# Patient Record
Sex: MALE | Race: WHITE | NOT HISPANIC OR LATINO | Employment: FULL TIME | URBAN - METROPOLITAN AREA
[De-identification: names, ages, dates, MRNs, and addresses within clinical notes are randomized per-mention and may not be internally consistent; named-entity substitution may affect disease eponyms.]

---
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
## 2023-01-19 ENCOUNTER — OFFICE VISIT (OUTPATIENT)
Dept: FAMILY MEDICINE CLINIC | Facility: CLINIC | Age: 18
End: 2023-01-19
Payer: COMMERCIAL

## 2023-01-19 VITALS
TEMPERATURE: 98.2 F | WEIGHT: 217.6 LBS | OXYGEN SATURATION: 98 % | SYSTOLIC BLOOD PRESSURE: 116 MMHG | DIASTOLIC BLOOD PRESSURE: 80 MMHG | BODY MASS INDEX: 25.8 KG/M2 | HEART RATE: 77 BPM

## 2023-01-19 DIAGNOSIS — Z98.890 HISTORY OF HEART SURGERY: ICD-10-CM

## 2023-01-19 DIAGNOSIS — N46.9 INFERTILITY MALE: ICD-10-CM

## 2023-01-19 DIAGNOSIS — R06.83 SNORING: ICD-10-CM

## 2023-01-19 DIAGNOSIS — F41.9 ANXIETY: ICD-10-CM

## 2023-01-19 DIAGNOSIS — M95.2 SKULL DEFECT: ICD-10-CM

## 2023-01-19 DIAGNOSIS — F32.A DEPRESSION, UNSPECIFIED DEPRESSION TYPE: Primary | ICD-10-CM

## 2023-01-19 DIAGNOSIS — Z13.220 SCREENING CHOLESTEROL LEVEL: ICD-10-CM

## 2023-01-19 PROCEDURE — 99204 OFFICE O/P NEW MOD 45 MIN: CPT | Performed by: STUDENT IN AN ORGANIZED HEALTH CARE EDUCATION/TRAINING PROGRAM

## 2023-01-19 RX ORDER — HYDROXYZINE HYDROCHLORIDE 25 MG/1
25 TABLET, FILM COATED ORAL EVERY 8 HOURS PRN
Qty: 30 TABLET | Refills: 0 | Status: SHIPPED | OUTPATIENT
Start: 2023-01-19

## 2023-01-19 NOTE — PATIENT INSTRUCTIONS
Thank you for allowing us to participate in your care.  Please call with any questions or concerns.;

## 2023-01-19 NOTE — PROGRESS NOTES
"Chief Complaint  Establish Care    Subjective        Keo Ji presents to [facility] PRIMARY [facility]  History of Present Illness     Wants to establish care. Pediatrician was Dr. Guerrero.   Had heart surgery for PFO at age 10. Was seen for 5 year follow up and was doing ok.  Does have terrible anxiety and depression. Does not have a psychiatrist. Tried a daily medication when he was little but had suicidal thoughts. Believe it was prozac. Is constantly worrying that something is wrong with his health. Will feel a bump on his arm and believe he is going to die.   Has never seen a therapist or counselor.   Currently smokes weed and drinks alcohol to help cope with anxiety and depression. Sometimes alcohol does cause him to be more depressed and other times he just blacks out so he doesn't feel anything. Has thought about alcohol treatment and has tried to quit on his own. Does have occasional thoughts of self harm. Does not currently have those thoughts.   PHQ score of 24 with things being very difficult.   Accompanied by mom. Believes he has sleep apnea. Snores. Father has it.   Had an episode where he felt his vision closing in, was shaking some, and had some foaming at the mouth. Does remember the episode. Did not have incontinence or bite his tongue.   Has fertility concerns as he has had unprotected sex for years and has not gotten anyone pregnant.     Objective   Vital Signs:  /80 (BP Location: Left arm, Patient Position: Sitting, Cuff Size: Adult)   Pulse 77   Temp 98.2 °F (36.8 °C) (Temporal)   Wt 98.7 kg (217 lb 9.6 oz)   SpO2 98%   BMI 25.80 kg/m²   Estimated body mass index is 25.8 kg/m² as calculated from the following:    Height as of 5/9/22: 195.6 cm (77\").    Weight as of this encounter: 98.7 kg (217 lb 9.6 oz).  78 %ile (Z= 0.76) based on CDC (Boys, 2-20 Years) BMI-for-age data using weight from 1/19/2023 and height from 5/9/2022.          Physical Exam  Vitals and nursing " note reviewed.   Constitutional:       General: He is not in acute distress.     Appearance: Normal appearance. He is not ill-appearing.   HENT:      Head: Normocephalic and atraumatic.      Comments: Bony prominence posterior skull     Nose: Nose normal.      Mouth/Throat:      Mouth: Mucous membranes are moist.   Eyes:      Extraocular Movements: Extraocular movements intact.      Conjunctiva/sclera: Conjunctivae normal.   Cardiovascular:      Rate and Rhythm: Normal rate and regular rhythm.      Heart sounds: Normal heart sounds. No murmur heard.    No gallop.   Pulmonary:      Effort: Pulmonary effort is normal. No respiratory distress.      Breath sounds: Normal breath sounds. No stridor. No wheezing, rhonchi or rales.   Chest:      Chest wall: No mass, lacerations, deformity or tenderness.   Skin:     General: Skin is warm and dry.   Neurological:      General: No focal deficit present.      Mental Status: He is alert and oriented to person, place, and time. Mental status is at baseline.   Psychiatric:         Mood and Affect: Mood normal.         Behavior: Behavior normal.        Result Review :                   Assessment and Plan   Diagnoses and all orders for this visit:    1. Depression, unspecified depression type (Primary)  -     Ambulatory Referral to Psychiatry  -     Ambulatory Referral to Behavioral Health  -     Comprehensive metabolic panel  -     TSH Rfx On Abnormal To Free T4    2. Anxiety  -     Ambulatory Referral to Psychiatry  -     Ambulatory Referral to Behavioral Health  -     hydrOXYzine (ATARAX) 25 MG tablet; Take 1 tablet by mouth Every 8 (Eight) Hours As Needed for Anxiety.  Dispense: 30 tablet; Refill: 0  -     Comprehensive metabolic panel  -     TSH Rfx On Abnormal To Free T4    3. History of heart surgery    4. Skull defect  -     Cancel: XR Skull Complete 4+ View (In Office)  -     XR Skull Complete 4+ View (In Office); Future    5. Snoring  -     Ambulatory Referral to Sleep  Medicine    6. Infertility male  -     Cancel: Semen Analysis - Semen, Voided  -     Comprehensive metabolic panel  -     TSH Rfx On Abnormal To Free T4    7. Screening cholesterol level  -     Lipid panel        Declines additional medication for anxiety or depression today as he has fears regarding increased suicidal thoughts. Does agree to see psychiatry and . Will place referrals today. Does drink and has some interest in stopping.   Will refer for sleep apnea testing.          Follow Up   Return in about 4 weeks (around 2/16/2023) for Annual physical.  Patient was given instructions and counseling regarding his condition or for health maintenance advice. Please see specific information pulled into the AVS if appropriate.

## 2023-01-20 LAB
ALBUMIN SERPL-MCNC: 4.8 G/DL (ref 3.5–5.2)
ALBUMIN/GLOB SERPL: 2.2 G/DL
ALP SERPL-CCNC: 82 U/L (ref 39–117)
ALT SERPL-CCNC: 28 U/L (ref 1–41)
AST SERPL-CCNC: 24 U/L (ref 1–40)
BILIRUB SERPL-MCNC: 0.4 MG/DL (ref 0–1.2)
BUN SERPL-MCNC: 15 MG/DL (ref 6–20)
BUN/CREAT SERPL: 16.1 (ref 7–25)
CALCIUM SERPL-MCNC: 9.7 MG/DL (ref 8.6–10.5)
CHLORIDE SERPL-SCNC: 103 MMOL/L (ref 98–107)
CHOLEST SERPL-MCNC: 164 MG/DL (ref 0–200)
CO2 SERPL-SCNC: 28.9 MMOL/L (ref 22–29)
CREAT SERPL-MCNC: 0.93 MG/DL (ref 0.76–1.27)
EGFRCR SERPLBLD CKD-EPI 2021: 121.3 ML/MIN/1.73
GLOBULIN SER CALC-MCNC: 2.2 GM/DL
GLUCOSE SERPL-MCNC: 102 MG/DL (ref 65–99)
HDLC SERPL-MCNC: 50 MG/DL (ref 40–60)
LDLC SERPL CALC-MCNC: 96 MG/DL (ref 0–100)
POTASSIUM SERPL-SCNC: 4.1 MMOL/L (ref 3.5–5.2)
PROT SERPL-MCNC: 7 G/DL (ref 6–8.5)
SODIUM SERPL-SCNC: 141 MMOL/L (ref 136–145)
TRIGL SERPL-MCNC: 95 MG/DL (ref 0–150)
TSH SERPL DL<=0.005 MIU/L-ACNC: 0.8 UIU/ML (ref 0.27–4.2)
VLDLC SERPL CALC-MCNC: 18 MG/DL (ref 5–40)

## 2023-01-23 ENCOUNTER — TELEPHONE (OUTPATIENT)
Dept: FAMILY MEDICINE CLINIC | Facility: CLINIC | Age: 18
End: 2023-01-23
Payer: COMMERCIAL

## 2023-01-23 LAB
SPECIMEN VOL SMN: 0.6 ML
SPERM IMMOTILE # SMN: 50.1 X10E6/ML
SPERM MOTILE # SMN: ABNORMAL X10E6/ML

## 2023-01-23 NOTE — TELEPHONE ENCOUNTER
Called, unable to leave message because mailbox full, will try again      **HUB/ ** MAY RELAY MESSAGE      ----- Message from Sangeetha Curiel DO sent at 1/20/2023  1:07 PM EST -----  Please call pt w the following     Sugar is mildly elevated. Other labs look normal. Semen analysis is still pending.

## 2023-02-01 ENCOUNTER — TELEPHONE (OUTPATIENT)
Dept: FAMILY MEDICINE CLINIC | Facility: CLINIC | Age: 18
End: 2023-02-01
Payer: COMMERCIAL

## 2023-02-01 DIAGNOSIS — R86.8 DECREASED SPERM MOTILITY: Primary | ICD-10-CM

## 2023-02-01 NOTE — TELEPHONE ENCOUNTER
Called patient and mailbox was full if patient calls back please transfer him back to extension [phone]

## 2023-02-01 NOTE — TELEPHONE ENCOUNTER
Called patient was unable to leave voicemail at 12:02 if patient calls back okay for  and hub to read     ----- Message -----  From: Sangeetha Curiel DO  Sent: 2/1/2023   6:20 AM EST  To: [facility] [facility]    Please let patient know his semen analysis was abnormal. His sperm were not motile. This could be due to a true motility problem or could be due to the sample sitting for too long. We can refer him to a specialist to further evaluate this.

## 2023-04-12 ENCOUNTER — TELEPHONE (OUTPATIENT)
Dept: FAMILY MEDICINE CLINIC | Facility: CLINIC | Age: 18
End: 2023-04-12
Payer: COMMERCIAL

## 2023-10-19 ENCOUNTER — OFFICE VISIT (OUTPATIENT)
Dept: FAMILY MEDICINE CLINIC | Facility: CLINIC | Age: 18
End: 2023-10-19
Payer: COMMERCIAL

## 2023-10-19 VITALS
OXYGEN SATURATION: 97 % | TEMPERATURE: 98.9 F | WEIGHT: 246 LBS | HEART RATE: 93 BPM | SYSTOLIC BLOOD PRESSURE: 141 MMHG | DIASTOLIC BLOOD PRESSURE: 80 MMHG | BODY MASS INDEX: 29.17 KG/M2

## 2023-10-19 DIAGNOSIS — L72.9 SKIN CYST: ICD-10-CM

## 2023-10-19 DIAGNOSIS — Q55.9 SCROTAL ANOMALY: ICD-10-CM

## 2023-10-19 DIAGNOSIS — R86.8 DECREASED SPERM MOTILITY: Primary | ICD-10-CM

## 2023-10-19 PROCEDURE — 99213 OFFICE O/P EST LOW 20 MIN: CPT | Performed by: STUDENT IN AN ORGANIZED HEALTH CARE EDUCATION/TRAINING PROGRAM

## 2023-10-19 NOTE — PROGRESS NOTES
"Chief Complaint  Testicle Pain    Subjective        Keo Ji presents to [facility] PRIMARY [facility]  History of Present Illness    Here today with concerns for his testicles. No pain. Someone he knows had a testicular issue and he had never been checked.   Has a red bump on his chest and feels he may need to have a dermatologist.    Objective   Vital Signs:  /80 (BP Location: Left arm, Patient Position: Sitting, Cuff Size: Large Adult)   Pulse 93   Temp 98.9 °F (37.2 °C)   Wt 112 kg (246 lb)   SpO2 97%   BMI 29.17 kg/m²   Estimated body mass index is 29.17 kg/m² as calculated from the following:    Height as of 5/9/22: 195.6 cm (77\").    Weight as of this encounter: 112 kg (246 lb).  Facility age limit for growth %kee is 20 years.          Physical Exam  Vitals and nursing note reviewed.   Constitutional:       General: He is not in acute distress.     Appearance: Normal appearance. He is not ill-appearing.   HENT:      Head: Normocephalic and atraumatic.      Nose: Nose normal.      Mouth/Throat:      Mouth: Mucous membranes are moist.   Eyes:      Extraocular Movements: Extraocular movements intact.      Conjunctiva/sclera: Conjunctivae normal.   Cardiovascular:      Rate and Rhythm: Normal rate and regular rhythm.      Heart sounds: Normal heart sounds. No murmur heard.     No gallop.   Pulmonary:      Effort: Pulmonary effort is normal. No respiratory distress.      Breath sounds: Normal breath sounds. No stridor. No wheezing, rhonchi or rales.   Chest:      Chest wall: No tenderness.   Skin:     General: Skin is warm and dry.   Neurological:      General: No focal deficit present.      Mental Status: He is alert and oriented to person, place, and time. Mental status is at baseline.   Psychiatric:         Mood and Affect: Mood normal.         Behavior: Behavior normal.        Result Review :                   Assessment and Plan   Diagnoses and all orders for this visit:    1. " Decreased sperm motility (Primary)  -     Ambulatory Referral to Infertility  -     Ambulatory Referral to Urology    2. Skin cyst  -     Ambulatory Referral to Dermatology    3. Scrotal anomaly  -     US Scrotum & Testicles  -     Ambulatory Referral to Urology             Follow Up   No follow-ups on file.  Patient was given instructions and counseling regarding his condition or for health maintenance advice. Please see specific information pulled into the AVS if appropriate.

## 2024-06-08 DIAGNOSIS — F41.9 ANXIETY: ICD-10-CM

## 2024-06-10 RX ORDER — HYDROXYZINE HYDROCHLORIDE 25 MG/1
25 TABLET, FILM COATED ORAL EVERY 8 HOURS PRN
Qty: 30 TABLET | Refills: 0 | OUTPATIENT
Start: 2024-06-10

## 2024-06-10 NOTE — TELEPHONE ENCOUNTER
LOV             10/19/2023   NOV             No follow-ups on file.   Last RF        1/19/223  Protocol       Not met    OXANA Conteh/GAUTAM

## 2024-06-18 ENCOUNTER — OFFICE VISIT (OUTPATIENT)
Dept: FAMILY MEDICINE CLINIC | Facility: CLINIC | Age: 19
End: 2024-06-18
Payer: COMMERCIAL

## 2024-06-18 VITALS
DIASTOLIC BLOOD PRESSURE: 72 MMHG | HEIGHT: 75 IN | WEIGHT: 260 LBS | SYSTOLIC BLOOD PRESSURE: 122 MMHG | HEART RATE: 78 BPM | TEMPERATURE: 98.2 F | BODY MASS INDEX: 32.33 KG/M2 | OXYGEN SATURATION: 97 %

## 2024-06-18 DIAGNOSIS — J02.9 SORE THROAT: ICD-10-CM

## 2024-06-18 DIAGNOSIS — R05.1 ACUTE COUGH: ICD-10-CM

## 2024-06-18 DIAGNOSIS — J45.20 MILD INTERMITTENT ASTHMA, UNSPECIFIED WHETHER COMPLICATED: ICD-10-CM

## 2024-06-18 DIAGNOSIS — J06.9 UPPER RESPIRATORY TRACT INFECTION, UNSPECIFIED TYPE: Primary | ICD-10-CM

## 2024-06-18 LAB
EXPIRATION DATE: ABNORMAL
EXPIRATION DATE: NORMAL
FLUAV AG UPPER RESP QL IA.RAPID: NOT DETECTED
FLUBV AG UPPER RESP QL IA.RAPID: NOT DETECTED
INTERNAL CONTROL: ABNORMAL
INTERNAL CONTROL: NORMAL
Lab: ABNORMAL
Lab: NORMAL
S PYO AG THROAT QL: NEGATIVE
SARS-COV-2 AG UPPER RESP QL IA.RAPID: NOT DETECTED

## 2024-06-18 PROCEDURE — 99214 OFFICE O/P EST MOD 30 MIN: CPT | Performed by: NURSE PRACTITIONER

## 2024-06-18 PROCEDURE — 87880 STREP A ASSAY W/OPTIC: CPT | Performed by: NURSE PRACTITIONER

## 2024-06-18 PROCEDURE — 87428 SARSCOV & INF VIR A&B AG IA: CPT | Performed by: NURSE PRACTITIONER

## 2024-06-18 RX ORDER — ALBUTEROL SULFATE 90 UG/1
1 AEROSOL, METERED RESPIRATORY (INHALATION)
COMMUNITY
Start: 2024-06-09 | End: 2024-06-18

## 2024-06-18 RX ORDER — METHYLPREDNISOLONE 4 MG/1
TABLET ORAL DAILY
COMMUNITY
Start: 2024-06-08 | End: 2024-06-18

## 2024-06-18 RX ORDER — DEXTROMETHORPHAN HYDROBROMIDE AND PROMETHAZINE HYDROCHLORIDE 15; 6.25 MG/5ML; MG/5ML
5 SYRUP ORAL NIGHTLY PRN
Qty: 118 ML | Refills: 0 | Status: SHIPPED | OUTPATIENT
Start: 2024-06-18

## 2024-06-18 RX ORDER — BENZONATATE 200 MG/1
1 CAPSULE ORAL 3 TIMES DAILY
COMMUNITY
Start: 2024-06-08 | End: 2024-06-18

## 2024-06-18 RX ORDER — OMEPRAZOLE 20 MG/1
20 CAPSULE, DELAYED RELEASE ORAL DAILY
Qty: 30 CAPSULE | Refills: 1 | Status: SHIPPED | OUTPATIENT
Start: 2024-06-18

## 2024-06-18 RX ORDER — ALBUTEROL SULFATE 90 UG/1
2 AEROSOL, METERED RESPIRATORY (INHALATION) EVERY 4 HOURS PRN
COMMUNITY

## 2024-06-18 NOTE — PATIENT INSTRUCTIONS
Continue increased intake of clear liquids and rest.  Resume Zyrtec daily.  Start Omeprazole daily.  Finish antibiotic as prescribed.  Continue Albuterol inhaler as needed.  Follow up pending lab results.  Follow up in 1 month for physical with fasting labs, or sooner if symptoms persist or worsen.

## 2024-06-18 NOTE — PROGRESS NOTES
Subjective   Keo Ji is a 19 y.o. male.     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 Just finished a round of antibiotics and steroids.     Headache       History of Present Illness   Patient of of Dr. 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, but has worked around this for years and has not had this problem; will cough really hard at night and will have sensation of gasping for air; will have coughing spells to point of vomiting at times; will get hot at times and feel weak; has had productive cough--yellowish; went to [facility] and gave Tessalon perles, Medrol dose pack and new inhaler, then 1 week later went to to different location and gave antibiotic (Augmentin) and Promethazine DM; Promethazine DM helped short term, but then ran out of medication; keeps getting sent home from work due to cough; has felt feverish at times; had chest x-ray at [facility] on [facility]; negative for pneumonia; typically Nyquil helps, but not at this point; has tried Robitussin and Delsym and have not helped; feels worn out from prolonged cough and affecting sleep; has history of asthma; has had some wheezing and chest tightness; has Albuterol inhaler from [facility]; finished steroid from [facility], has few more days of antibiotic (Augmentin) left; cough during the day has improved, but still bad at night; has had some decreased appetite; no heartburn or indigestion; typically has trouble with seasonal allergies, but never had symptoms like this.    F/U asthma: typically does not have to use Albuterol inhaler    Grandmother was present during the history-taking and subsequent discussion with this patient.  Patient agrees to the presence of the individual during this visit.      The following  portions of the patient's history were reviewed and updated as appropriate: allergies, current medications, past family history, past medical history, past social history, past surgical history and problem list.    Current Outpatient Medications on File Prior to Visit   Medication Sig    albuterol sulfate  (90 Base) MCG/ACT inhaler Inhale 2 puffs Every 4 (Four) Hours As Needed for Wheezing.    amoxicillin-clavulanate (AUGMENTIN) 875-125 MG per tablet Take 1 tablet by mouth 2 (Two) Times a Day.    [DISCONTINUED] hydrOXYzine (ATARAX) 25 MG tablet Take 1 tablet by mouth Every 8 (Eight) Hours As Needed for Anxiety. (Patient not taking: Reported on 6/18/2024)     No current facility-administered medications on file prior to visit.        Past Medical History:   Diagnosis Date    Asthma        Past Surgical History:   Procedure Laterality Date    PATENT FORAMEN OVALE CLOSURE  2017    WISDOM TOOTH EXTRACTION         Family History   Problem Relation Age of Onset    COPD Mother     Diabetes Father     COPD Maternal Grandmother     Diabetes Maternal Grandfather     Cerebral aneurysm Paternal Grandfather     Heart attack Paternal Grandfather        Social History     Socioeconomic History    Marital status: Single   Tobacco Use    Smoking status: Every Day     Types: Cigarettes    Smokeless tobacco: Never    Tobacco comments:     Will vape when at home, will smoke cigarettes when out   Vaping Use    Vaping status: Every Day   Substance and Sexual Activity    Alcohol use: Yes       Review of Systems   Constitutional:  Positive for fatigue. Negative for unexpected weight gain and unexpected weight loss. Appetite change: see HPI.  HENT:  Negative for ear pain, sinus pressure, sore throat and trouble swallowing.    Eyes:  Negative for discharge. Blurred vision: some due to poor sleep.  Respiratory:  Positive for cough and shortness of breath. Chest tightness: some at times.   Cardiovascular:  Negative for chest pain and  "leg swelling. Palpitations: some brief episodes since has been sick.  Gastrointestinal:  Negative for abdominal pain (some at times with cough), constipation, diarrhea, GERD and indigestion.   Genitourinary:  Negative for decreased urine volume, dysuria and frequency.   Musculoskeletal:  Negative for back pain.   Skin:  Negative for rash.   Neurological:  Dizziness: some at times. Headache: some with cough; has been taking Aleve as needed and helps some.       Objective   Vitals:    06/18/24 1453   BP: 122/72   Pulse: 78   Temp: 98.2 °F (36.8 °C)   TempSrc: Temporal   SpO2: 97%   Weight: 118 kg (260 lb)   Height: 190.5 cm (75\")     Body mass index is 32.5 kg/m².    Physical Exam  Vitals and nursing note reviewed.   Constitutional:       General: He is not in acute distress.     Appearance: He is well-developed and well-groomed. He is not diaphoretic.   HENT:      Head: Normocephalic.      Right Ear: External ear normal. No decreased hearing noted. Right ear middle ear effusion: mild. Tympanic membrane is not erythematous. Right ear injected TM: mild.     Left Ear: External ear normal. No decreased hearing noted. Left ear middle ear effusion: mild. Tympanic membrane is not erythematous. Left ear injected TM: mild.     Nose: Nose normal.      Right Sinus: No maxillary sinus tenderness or frontal sinus tenderness.      Left Sinus: No maxillary sinus tenderness or frontal sinus tenderness.      Mouth/Throat:      Mouth: Mucous membranes are moist.      Pharynx: Posterior oropharyngeal erythema present. No oropharyngeal exudate.   Eyes:      Conjunctiva/sclera: Conjunctivae normal.   Neck:      Vascular: No carotid bruit.   Cardiovascular:      Rate and Rhythm: Normal rate and regular rhythm.      Pulses: Normal pulses.      Heart sounds: Normal heart sounds. No murmur heard.  Pulmonary:      Effort: Pulmonary effort is normal. No respiratory distress.      Breath sounds: Normal breath sounds. No wheezing.   Abdominal: "      General: Bowel sounds are normal.      Palpations: Abdomen is soft. There is no hepatomegaly or splenomegaly.      Tenderness: There is no abdominal tenderness. There is no guarding.   Musculoskeletal:      Cervical back: Normal range of motion and neck supple.      Right lower leg: No edema.      Left lower leg: No edema.   Lymphadenopathy:      Cervical: No cervical adenopathy.   Skin:     General: Skin is warm and dry.      Findings: No rash.   Neurological:      Mental Status: He is alert and oriented to person, place, and time.      Gait: Gait normal.   Psychiatric:         Mood and Affect: Mood normal.         Behavior: Behavior normal.         Thought Content: Thought content normal.         Cognition and Memory: Cognition normal.         Judgment: Judgment normal.         Lab Results   Component Value Date    WBC 7.18 07/02/2021    RBC 5.15 07/02/2021    HGB 15.9 07/02/2021    HCT 46.6 07/02/2021    MCV 90.5 07/02/2021    MCH 30.9 07/02/2021    MCHC 34.1 07/02/2021    RDW 12.1 (L) 07/02/2021    RDWSD 39.8 07/02/2021    MPV 11.0 07/02/2021     07/02/2021    NEUTRORELPCT 62.1 07/02/2021    LYMPHORELPCT 26.9 07/02/2021    MONORELPCT 8.9 07/02/2021    EOSRELPCT 1.4 07/02/2021    BASORELPCT 0.6 07/02/2021    AUTOIGPER 0.1 07/02/2021    NEUTROABS 4.60 07/19/2023    LYMPHSABS 1.93 07/02/2021    MONOSABS 0.64 07/02/2021    EOSABS 0.1 07/19/2023    BASOSABS 0.0 07/19/2023    AUTOIGNUM 0.01 07/02/2021    NRBC 0.0 07/02/2021     Lab Results   Component Value Date    GLUCOSE 102 (H) 01/19/2023    BUN 15 01/19/2023    CREATININE 0.93 01/19/2023    EGFRIFNONA 124 07/02/2021    BCR 16.1 01/19/2023    K 4.1 01/19/2023    CO2 28.9 01/19/2023    CALCIUM 9.7 01/19/2023    PROTENTOTREF 7.0 01/19/2023    ALBUMIN 4.8 01/19/2023    LABIL2 2.2 01/19/2023    AST 24 01/19/2023    ALT 28 01/19/2023      Lab Results   Component Value Date    CHLPL 164 01/19/2023    TRIG 95 01/19/2023    HDL 50 01/19/2023    VLDL 18  01/19/2023    LDL 96 01/19/2023     Lab Results   Component Value Date    TSH 0.802 01/19/2023         Assessment    Problem List Items Addressed This Visit       Asthma    Current Assessment & Plan     Continue Albuterol inhaler as needed.  Resume Zyrtec daily.         Relevant Medications    albuterol sulfate  (90 Base) MCG/ACT inhaler    Upper respiratory tract infection - Primary    Current Assessment & Plan     Continue increased intake of clear liquids and rest.  Resume Zyrtec daily.  Finish Augmentin as prescribed.         Relevant Medications    amoxicillin-clavulanate (AUGMENTIN) 875-125 MG per tablet    Acute cough    Current Assessment & Plan     Start Omeprazole daily.  Start Zyrtec daily.  Continue Albuterol inhaler as needed.         Relevant Medications    omeprazole (priLOSEC) 20 MG capsule    promethazine-dextromethorphan (PROMETHAZINE-DM) 6.25-15 MG/5ML syrup    Other Relevant Orders    POCT SARS-CoV-2 Antigen AMILCAR + Flu (Completed)    CBC & Differential    Comprehensive Metabolic Panel     Other Visit Diagnoses       Sore throat        Relevant Orders    POCT rapid strep A (Completed)             Return in about 1 month (around 7/18/2024) for Annual physical, Recheck.or sooner if symptoms persist or worsen.  Will request recent x-ray from [place] on [address].  Will start Omeprazole daily and see if treating possible reflux symptoms also helps cough; will also resume daily allergy medication.         COVID-19 Precautions - Patient was compliant in wearing a mask. When I saw the patient, I used appropriate personal protective equipment (PPE) including N95 mask, gloves, and eye shield (standard procedure).  Hand hygiene was completed before and after seeing the patient.

## 2024-06-19 ENCOUNTER — TELEPHONE (OUTPATIENT)
Dept: FAMILY MEDICINE CLINIC | Facility: CLINIC | Age: 19
End: 2024-06-19
Payer: COMMERCIAL

## 2024-06-19 DIAGNOSIS — J30.2 SEASONAL ALLERGIES: ICD-10-CM

## 2024-06-19 DIAGNOSIS — R05.1 ACUTE COUGH: ICD-10-CM

## 2024-06-19 DIAGNOSIS — J45.20 MILD INTERMITTENT ASTHMA, UNSPECIFIED WHETHER COMPLICATED: Primary | ICD-10-CM

## 2024-06-19 PROBLEM — Z86.59 HISTORY OF ATTENTION DEFICIT DISORDER: Status: ACTIVE | Noted: 2024-06-19

## 2024-06-19 PROBLEM — J06.9 UPPER RESPIRATORY TRACT INFECTION: Status: ACTIVE | Noted: 2024-06-19

## 2024-06-19 LAB
ALBUMIN SERPL-MCNC: 4.9 G/DL (ref 4.3–5.2)
ALP SERPL-CCNC: 79 IU/L (ref 44–121)
ALT SERPL-CCNC: 62 IU/L (ref 0–44)
AST SERPL-CCNC: 37 IU/L (ref 0–40)
BASOPHILS # BLD AUTO: 0 X10E3/UL (ref 0–0.2)
BASOPHILS NFR BLD AUTO: 0 %
BILIRUB SERPL-MCNC: 0.7 MG/DL (ref 0–1.2)
BUN SERPL-MCNC: 15 MG/DL (ref 6–20)
BUN/CREAT SERPL: 15 (ref 9–20)
CALCIUM SERPL-MCNC: 9.6 MG/DL (ref 8.7–10.2)
CHLORIDE SERPL-SCNC: 103 MMOL/L (ref 96–106)
CO2 SERPL-SCNC: 23 MMOL/L (ref 20–29)
CREAT SERPL-MCNC: 0.98 MG/DL (ref 0.76–1.27)
EGFRCR SERPLBLD CKD-EPI 2021: 113 ML/MIN/1.73
EOSINOPHIL # BLD AUTO: 0.2 X10E3/UL (ref 0–0.4)
EOSINOPHIL NFR BLD AUTO: 2 %
ERYTHROCYTE [DISTWIDTH] IN BLOOD BY AUTOMATED COUNT: 12.5 % (ref 11.6–15.4)
GLOBULIN SER CALC-MCNC: 2.4 G/DL (ref 1.5–4.5)
GLUCOSE SERPL-MCNC: 81 MG/DL (ref 70–99)
HCT VFR BLD AUTO: 47.7 % (ref 37.5–51)
HGB BLD-MCNC: 16.2 G/DL (ref 13–17.7)
IMM GRANULOCYTES # BLD AUTO: 0 X10E3/UL (ref 0–0.1)
IMM GRANULOCYTES NFR BLD AUTO: 0 %
LYMPHOCYTES # BLD AUTO: 1.5 X10E3/UL (ref 0.7–3.1)
LYMPHOCYTES NFR BLD AUTO: 22 %
MCH RBC QN AUTO: 30.9 PG (ref 26.6–33)
MCHC RBC AUTO-ENTMCNC: 34 G/DL (ref 31.5–35.7)
MCV RBC AUTO: 91 FL (ref 79–97)
MONOCYTES # BLD AUTO: 0.7 X10E3/UL (ref 0.1–0.9)
MONOCYTES NFR BLD AUTO: 10 %
NEUTROPHILS # BLD AUTO: 4.3 X10E3/UL (ref 1.4–7)
NEUTROPHILS NFR BLD AUTO: 66 %
PLATELET # BLD AUTO: 191 X10E3/UL (ref 150–450)
POTASSIUM SERPL-SCNC: 4.2 MMOL/L (ref 3.5–5.2)
PROT SERPL-MCNC: 7.3 G/DL (ref 6–8.5)
RBC # BLD AUTO: 5.24 X10E6/UL (ref 4.14–5.8)
SODIUM SERPL-SCNC: 140 MMOL/L (ref 134–144)
WBC # BLD AUTO: 6.7 X10E3/UL (ref 3.4–10.8)

## 2024-06-19 RX ORDER — AMOXICILLIN AND CLAVULANATE POTASSIUM 875; 125 MG/1; MG/1
1 TABLET, FILM COATED ORAL 2 TIMES DAILY
COMMUNITY

## 2024-06-19 NOTE — ASSESSMENT & PLAN NOTE
Continue increased intake of clear liquids and rest.  Resume Zyrtec daily.  Finish Augmentin as prescribed.

## 2025-04-15 ENCOUNTER — OFFICE VISIT (OUTPATIENT)
Dept: FAMILY MEDICINE CLINIC | Facility: CLINIC | Age: 20
End: 2025-04-15
Payer: COMMERCIAL

## 2025-04-15 VITALS
HEIGHT: 75 IN | WEIGHT: 275 LBS | TEMPERATURE: 98.2 F | OXYGEN SATURATION: 98 % | SYSTOLIC BLOOD PRESSURE: 118 MMHG | BODY MASS INDEX: 34.19 KG/M2 | DIASTOLIC BLOOD PRESSURE: 74 MMHG | HEART RATE: 74 BPM

## 2025-04-15 DIAGNOSIS — Z00.00 WELLNESS EXAMINATION: Primary | ICD-10-CM

## 2025-04-15 DIAGNOSIS — F32.A DEPRESSION, UNSPECIFIED DEPRESSION TYPE: ICD-10-CM

## 2025-04-15 DIAGNOSIS — F41.9 ANXIETY: ICD-10-CM

## 2025-04-15 DIAGNOSIS — R74.01 TRANSAMINITIS: ICD-10-CM

## 2025-04-15 DIAGNOSIS — Z23 ENCOUNTER FOR IMMUNIZATION: ICD-10-CM

## 2025-04-15 DIAGNOSIS — Z11.3 SCREEN FOR STD (SEXUALLY TRANSMITTED DISEASE): ICD-10-CM

## 2025-04-15 DIAGNOSIS — Z13.220 SCREENING CHOLESTEROL LEVEL: ICD-10-CM

## 2025-04-15 DIAGNOSIS — T14.90XA TRAUMA: ICD-10-CM

## 2025-04-15 PROBLEM — J30.2 SEASONAL ALLERGIC RHINITIS: Status: ACTIVE | Noted: 2025-04-15

## 2025-04-15 PROBLEM — Q24.9 CONGENITAL HEART DISEASE: Status: ACTIVE | Noted: 2025-04-15

## 2025-04-15 PROBLEM — R05.1 ACUTE COUGH: Status: RESOLVED | Noted: 2024-06-19 | Resolved: 2025-04-15

## 2025-04-15 PROBLEM — J06.9 UPPER RESPIRATORY TRACT INFECTION: Status: RESOLVED | Noted: 2024-06-19 | Resolved: 2025-04-15

## 2025-04-15 PROBLEM — J30.2 SEASONAL ALLERGIES: Status: RESOLVED | Noted: 2024-06-19 | Resolved: 2025-04-15

## 2025-04-15 RX ORDER — ALBUTEROL SULFATE 90 UG/1
2 INHALANT RESPIRATORY (INHALATION) EVERY 4 HOURS PRN
Qty: 18 G | Refills: 3 | Status: SHIPPED | OUTPATIENT
Start: 2025-04-15

## 2025-04-15 NOTE — PROGRESS NOTES
Chief Complaint  Depression (Pt. Would like to be checked for diabetes)    Subjective        Keo Ji presents to [facility]    History of Present Illness  The patient presents for evaluation of anxiety, depression, and health maintenance.    He is seeking a therapist to help manage mental health issues, including anxiety, depression, and trauma. Previous therapy has been engaged, and various medications such as hydroxyzine, Lexapro, Zoloft, Paxil, and Prozac have been prescribed. These medications were discontinued due to insufficient efficacy or adverse side effects, with Prozac inducing suicidal ideation. No history of self-harm is reported.    Interest in receiving a tetanus vaccine and STD screening is expressed. Hepatitis C screening has not been performed as an adult. The last dental examination was approximately 02/2025, during which a tooth extraction occurred. An eye examination has not been conducted in several years, and corrective lenses are not currently used, although glasses were worn in the past. Vision insurance coverage is believed to be available. The 's license is due for renewal in 2026.    A history of daily alcohol consumption is admitted, with current intake restricted to weekends. The diet is not particularly healthy, characterized by overeating and frequent consumption of fast food.    Sperm analysis in 2023 revealed abnormal results, negatively impacting mental health, particularly during a period when an ex-partner became pregnant.    SOCIAL HISTORY  The patient admits to drinking alcohol on the weekends, though less frequently than in the past when he drank daily. He resides with his grandfather.   Pneumococcal Vaccine 0-49(1 of 1 - PPSV23) due on 01/20/2009  MENINGOCOCCAL B VACCINE(1 of 2 - Standard) Never done  HEPATITIS C SCREENING Never done  ANNUAL PHYSICAL Never done  TDAP/TD VACCINES(2 - Td or Tdap) due on 07/02/2024  COVID-19 Vaccine(1 -  "2024-25 season) Never done  INFLUENZA VACCINE due on 07/01/2025  MENINGOCOCCAL VACCINE Aged Out        Objective   Vital Signs:  /74 (BP Location: Left arm, Patient Position: Sitting, Cuff Size: Adult)   Pulse 74   Temp 98.2 °F (36.8 °C)   Ht 190.5 cm (75\")   Wt 125 kg (275 lb)   SpO2 98%   BMI 34.37 kg/m²   Estimated body mass index is 34.37 kg/m² as calculated from the following:    Height as of this encounter: 190.5 cm (75\").    Weight as of this encounter: 125 kg (275 lb).          Physical Exam  Vitals and nursing note reviewed.   Constitutional:       General: He is not in acute distress.     Appearance: Normal appearance. He is not ill-appearing.   HENT:      Head: Normocephalic and atraumatic.      Nose: Nose normal.      Mouth/Throat:      Mouth: Mucous membranes are moist.   Eyes:      Extraocular Movements: Extraocular movements intact.      Conjunctiva/sclera: Conjunctivae normal.   Cardiovascular:      Rate and Rhythm: Normal rate and regular rhythm.      Heart sounds: Normal heart sounds. No murmur heard.     No gallop.   Pulmonary:      Effort: Pulmonary effort is normal. No respiratory distress.      Breath sounds: Normal breath sounds. No stridor. No wheezing, rhonchi or rales.   Chest:      Chest wall: No tenderness.   Skin:     General: Skin is warm and dry.   Neurological:      General: No focal deficit present.      Mental Status: He is alert and oriented to person, place, and time. Mental status is at baseline.   Psychiatric:         Mood and Affect: Mood normal.         Behavior: Behavior normal.          Physical Exam  Respiratory: Clear to auscultation, no wheezing, rales or rhonchi       Result Review :               Results  Labs   - ALT: Slightly high   - Anemia screen: Normal          Assessment and Plan   Diagnoses and all orders for this visit:    1. Wellness examination (Primary)    2. Anxiety  -     Ambulatory Referral to Behavioral Health    3. Depression, unspecified " depression type  -     Ambulatory Referral to Behavioral Health    4. Trauma  -     Ambulatory Referral to Behavioral Health    5. Transaminitis  -     Comprehensive metabolic panel    6. Screening cholesterol level  -     Lipid panel    7. Screen for STD (sexually transmitted disease)  -     Hepatitis C antibody  -     HIV-1/O/2 Ag/Ab w Reflex  -     RPR  -     Chlamydia trachomatis, Neisseria gonorrhoeae, PCR - Urine, Urine, Clean Catch    8. Encounter for immunization  -     Tdap Vaccine => 8yo IM (BOOSTRIX/ADACEL)    Other orders  -     albuterol sulfate  (90 Base) MCG/ACT inhaler; Inhale 2 puffs Every 4 (Four) Hours As Needed for Wheezing.  Dispense: 18 g; Refill: 3        Assessment & Plan  1. Anxiety.  - Reports experiencing anxiety and has previously tried multiple medications, including hydroxyzine and Prozac, which caused undesirable side effects.  - Prefers to avoid medication at this time and seeks therapy as an alternative.  - Discussed the need for therapy to help manage anxiety and improve mental health.  - A referral to a therapist will be made to assist in managing anxiety.    2. Depression.  - Reports symptoms of depression and has previously tried medications such as Prozac, which caused suicidal thoughts.  - Prefers to avoid medication at this time and seeks therapy as an alternative.  - Discussed the need for therapy to help manage depression and improve mental health.  - A referral to a therapist will be made to assist in managing depression.    3. Health Maintenance.  - ALT levels were elevated during the last blood work conducted 10 months ago, while the anemia screen was within normal limits.  - Cholesterol levels have not been assessed recently and are due for reevaluation.  - Will receive a tetanus vaccine today. Meningitis B vaccine was discussed but declined.  - STD screening will be conducted today. Advised to schedule an eye examination and consider ordering glasses online if  needed.    4. Elevated liver enzymes.  - ALT levels were elevated during the last blood work conducted 10 months ago.  - Advised to reduce alcohol consumption and improve diet to help lower liver markers.  - Discussed the impact of alcohol and unhealthy diet on liver function.  - Further monitoring of liver function will be conducted.    5. Infertility.  - Underwent sperm analysis in 2023, which revealed abnormal results.  - This information negatively impacted mental health, particularly during a period when the ex-partner became pregnant.  - Discussed the emotional impact of infertility and the options provided by specialists.  - No specific treatment or referral needed at this time, but emotional support and counseling may be beneficial.            Follow Up   Return in about 1 year (around 4/15/2026) for Annual physical.  Patient was given instructions and counseling regarding his condition or for health maintenance advice. Please see specific information pulled into the AVS if appropriate.           Patient or patient representative verbalized consent for the use of Ambient Listening during the visit with  Sangeetha Curiel DO for chart documentation. 4/17/2025  13:02 EDT

## 2025-04-16 LAB
ALBUMIN SERPL-MCNC: 4.7 G/DL (ref 4.3–5.2)
ALP SERPL-CCNC: 96 IU/L (ref 44–121)
ALT SERPL-CCNC: 61 IU/L (ref 0–44)
AST SERPL-CCNC: 38 IU/L (ref 0–40)
BILIRUB SERPL-MCNC: 0.5 MG/DL (ref 0–1.2)
BUN SERPL-MCNC: 11 MG/DL (ref 6–20)
BUN/CREAT SERPL: 12 (ref 9–20)
CALCIUM SERPL-MCNC: 9.7 MG/DL (ref 8.7–10.2)
CHLORIDE SERPL-SCNC: 101 MMOL/L (ref 96–106)
CHOLEST SERPL-MCNC: 218 MG/DL (ref 100–199)
CO2 SERPL-SCNC: 23 MMOL/L (ref 20–29)
CREAT SERPL-MCNC: 0.93 MG/DL (ref 0.76–1.27)
EGFRCR SERPLBLD CKD-EPI 2021: 119 ML/MIN/1.73
GLOBULIN SER CALC-MCNC: 2.5 G/DL (ref 1.5–4.5)
GLUCOSE SERPL-MCNC: 98 MG/DL (ref 70–99)
HCV IGG SERPL QL IA: NON REACTIVE
HDLC SERPL-MCNC: 40 MG/DL
HIV 1+2 AB+HIV1 P24 AG SERPL QL IA: NON REACTIVE
LDLC SERPL CALC-MCNC: 132 MG/DL (ref 0–99)
POTASSIUM SERPL-SCNC: 4.2 MMOL/L (ref 3.5–5.2)
PROT SERPL-MCNC: 7.2 G/DL (ref 6–8.5)
RPR SER QL: NON REACTIVE
SODIUM SERPL-SCNC: 140 MMOL/L (ref 134–144)
TRIGL SERPL-MCNC: 257 MG/DL (ref 0–149)
VLDLC SERPL CALC-MCNC: 46 MG/DL (ref 5–40)

## 2025-04-17 LAB
C TRACH RRNA SPEC QL NAA+PROBE: NEGATIVE
N GONORRHOEA RRNA SPEC QL NAA+PROBE: NEGATIVE

## 2025-08-04 ENCOUNTER — OFFICE VISIT (OUTPATIENT)
Dept: FAMILY MEDICINE CLINIC | Facility: CLINIC | Age: 20
End: 2025-08-04
Payer: COMMERCIAL

## 2025-08-04 VITALS
DIASTOLIC BLOOD PRESSURE: 70 MMHG | TEMPERATURE: 97.5 F | OXYGEN SATURATION: 100 % | BODY MASS INDEX: 35.11 KG/M2 | SYSTOLIC BLOOD PRESSURE: 108 MMHG | HEART RATE: 83 BPM | WEIGHT: 282.4 LBS | HEIGHT: 75 IN

## 2025-08-04 DIAGNOSIS — F41.9 ANXIETY: ICD-10-CM

## 2025-08-04 DIAGNOSIS — M54.9 UPPER BACK PAIN ON LEFT SIDE: ICD-10-CM

## 2025-08-04 DIAGNOSIS — L72.9 SKIN CYST: ICD-10-CM

## 2025-08-04 DIAGNOSIS — R07.9 CHEST PAIN, UNSPECIFIED TYPE: Primary | ICD-10-CM

## 2025-08-04 PROCEDURE — 99214 OFFICE O/P EST MOD 30 MIN: CPT | Performed by: NURSE PRACTITIONER

## 2025-08-04 PROCEDURE — 93000 ELECTROCARDIOGRAM COMPLETE: CPT | Performed by: NURSE PRACTITIONER
